# Patient Record
Sex: MALE | Race: WHITE | NOT HISPANIC OR LATINO | Employment: FULL TIME | URBAN - METROPOLITAN AREA
[De-identification: names, ages, dates, MRNs, and addresses within clinical notes are randomized per-mention and may not be internally consistent; named-entity substitution may affect disease eponyms.]

---

## 2017-02-07 ENCOUNTER — ALLSCRIPTS OFFICE VISIT (OUTPATIENT)
Dept: OTHER | Facility: OTHER | Age: 47
End: 2017-02-07

## 2017-11-30 ENCOUNTER — GENERIC CONVERSION - ENCOUNTER (OUTPATIENT)
Dept: OTHER | Facility: OTHER | Age: 47
End: 2017-11-30

## 2017-11-30 DIAGNOSIS — S39.012A STRAIN OF MUSCLE, FASCIA AND TENDON OF LOWER BACK, INITIAL ENCOUNTER: ICD-10-CM

## 2018-01-14 VITALS — HEART RATE: 80 BPM | DIASTOLIC BLOOD PRESSURE: 82 MMHG | SYSTOLIC BLOOD PRESSURE: 110 MMHG

## 2018-01-22 VITALS — SYSTOLIC BLOOD PRESSURE: 110 MMHG | HEART RATE: 72 BPM | RESPIRATION RATE: 16 BRPM | DIASTOLIC BLOOD PRESSURE: 80 MMHG

## 2020-02-21 ENCOUNTER — OFFICE VISIT (OUTPATIENT)
Dept: FAMILY MEDICINE CLINIC | Facility: CLINIC | Age: 50
End: 2020-02-21
Payer: COMMERCIAL

## 2020-02-21 VITALS
WEIGHT: 185 LBS | HEART RATE: 78 BPM | BODY MASS INDEX: 26.48 KG/M2 | SYSTOLIC BLOOD PRESSURE: 120 MMHG | HEIGHT: 70 IN | DIASTOLIC BLOOD PRESSURE: 70 MMHG | OXYGEN SATURATION: 98 %

## 2020-02-21 DIAGNOSIS — R53.83 FATIGUE, UNSPECIFIED TYPE: Primary | ICD-10-CM

## 2020-02-21 DIAGNOSIS — Z13.1 SCREENING FOR DIABETES MELLITUS: ICD-10-CM

## 2020-02-21 DIAGNOSIS — Z13.6 SCREENING FOR CARDIOVASCULAR CONDITION: ICD-10-CM

## 2020-02-21 DIAGNOSIS — M25.50 ARTHRALGIA, UNSPECIFIED JOINT: ICD-10-CM

## 2020-02-21 DIAGNOSIS — M79.10 MYALGIA: ICD-10-CM

## 2020-02-21 PROCEDURE — 1036F TOBACCO NON-USER: CPT | Performed by: NURSE PRACTITIONER

## 2020-02-21 PROCEDURE — 99214 OFFICE O/P EST MOD 30 MIN: CPT | Performed by: NURSE PRACTITIONER

## 2020-02-21 PROCEDURE — 3008F BODY MASS INDEX DOCD: CPT | Performed by: NURSE PRACTITIONER

## 2020-02-21 NOTE — PROGRESS NOTES
[facility]        NAME: Fern Rice II is a 54 y o  male  : 1970    MRN: [id number]  DATE: 2020  TIME: 9:01 AM    Assessment and Plan   Fatigue, unspecified type [R53 83]  1  Fatigue, unspecified type  TSH, 3rd generation with Free T4 reflex    CBC and differential    Lyme Antibody Profile with reflex to WB    TSH, 3rd generation with Free T4 reflex    CBC and differential    Lyme Antibody Profile with reflex to WB   2  Screening for cardiovascular condition  TSH, 3rd generation with Free T4 reflex    Lipid panel    TSH, 3rd generation with Free T4 reflex    Lipid panel   3  Screening for diabetes mellitus  Comprehensive metabolic panel    Comprehensive metabolic panel   4  Myalgia     5  Arthralgia, unspecified joint  Lyme Antibody Profile with reflex to WB         Patient Instructions     Patient Instructions   Labs as ordered  Schedule appointment for annual physical which is due now  Call/retunw if symptoms worsen  Call/return with any problems or concerns          Chief Complaint     Chief Complaint   Patient presents with   [address] [place] [address] Patient     "old new pt"--fatigue, body aches x 12days         History of Present Illness       Body aches, fatigue x 2 weeks  Sleeping more than usual  Not feeling rested  States always has general aches and pains  Patient is a   Denies chest pain, sob  Has some post nasal drip  No other complaints  Due for annual physical      Review of Systems   Review of Systems   Constitutional: Positive for fatigue  Negative for appetite change, chills, diaphoresis and fever  HENT: Negative for congestion, ear pain, postnasal drip, rhinorrhea, sinus pressure, sinus pain and sore throat  Respiratory: Negative  Negative for cough, shortness of breath and wheezing  Cardiovascular: Negative  Negative for chest pain, palpitations and leg swelling  Gastrointestinal: Negative    Negative for abdominal distention, abdominal pain, constipation, diarrhea, nausea and vomiting  Musculoskeletal: Positive for arthralgias and myalgias  Neurological: Positive for headaches  Negative for dizziness, weakness and light-headedness  Psychiatric/Behavioral: Negative  Current Medications     No current outpatient medications on file  Current Allergies     Allergies as of 02/21/2020 - Reviewed 02/21/2020   Allergen Reaction Noted    Asa [aspirin]  02/21/2020            The following portions of the patient's history were reviewed and updated as appropriate: allergies, current medications, past family history, past medical history, past social history, past surgical history and problem list      History reviewed  No pertinent past medical history  History reviewed  No pertinent surgical history  Family History   Problem Relation Age of Onset    Diabetes Mother          Medications have been verified  Objective   /70   Pulse 78   Ht 5' 9 5" (1 765 m)   Wt 83 9 kg (185 lb)   SpO2 98%   BMI 26 93 kg/m²        Physical Exam     Physical Exam   Constitutional: He is oriented to person, place, and time  He appears well-developed and well-nourished  HENT:   Head: Normocephalic  Right Ear: External ear normal    Left Ear: External ear normal    Nose: Nose normal    Mouth/Throat: Posterior oropharyngeal erythema (mild erythema, moderate mucous noted ) present  No oropharyngeal exudate  Eyes: EOM are normal    Neck: Normal range of motion  Neck supple  Cardiovascular: Normal rate, regular rhythm and normal heart sounds  Pulmonary/Chest: Effort normal and breath sounds normal  He has no wheezes  Musculoskeletal: Normal range of motion  He exhibits no edema or deformity  Neurological: He is alert and oriented to person, place, and time  Skin: Skin is warm and dry  Psychiatric: He has a normal mood and affect   His behavior is normal  Judgment and thought content normal    Nursing note and vitals reviewed  BMI Counseling: Body mass index is 26 93 kg/m²  The BMI is above normal  Nutrition recommendations include reducing portion sizes

## 2020-02-21 NOTE — PATIENT INSTRUCTIONS
Labs as ordered  Schedule appointment for annual physical which is due now  Call/retunw if symptoms worsen  Call/return with any problems or concerns

## 2020-02-25 DIAGNOSIS — R53.83 FATIGUE, UNSPECIFIED TYPE: Primary | ICD-10-CM

## 2020-02-25 LAB
ALBUMIN SERPL-MCNC: 4.2 G/DL (ref 4–5)
ALBUMIN/GLOB SERPL: 1.7 {RATIO} (ref 1.2–2.2)
ALP SERPL-CCNC: 75 IU/L (ref 39–117)
ALT SERPL-CCNC: 21 IU/L (ref 0–44)
AST SERPL-CCNC: 17 IU/L (ref 0–40)
B BURGDOR IGG+IGM SER-ACNC: <0.91 ISR (ref 0–0.9)
BASOPHILS # BLD AUTO: 0.1 X10E3/UL (ref 0–0.2)
BASOPHILS NFR BLD AUTO: 1 %
BILIRUB SERPL-MCNC: 0.6 MG/DL (ref 0–1.2)
BUN SERPL-MCNC: 15 MG/DL (ref 6–24)
BUN/CREAT SERPL: 18 (ref 9–20)
CALCIUM SERPL-MCNC: 9.1 MG/DL (ref 8.7–10.2)
CHLORIDE SERPL-SCNC: 103 MMOL/L (ref 96–106)
CHOLEST SERPL-MCNC: 218 MG/DL (ref 100–199)
CHOLEST/HDLC SERPL: 3.2 RATIO (ref 0–5)
CO2 SERPL-SCNC: 23 MMOL/L (ref 20–29)
CREAT SERPL-MCNC: 0.82 MG/DL (ref 0.76–1.27)
EOSINOPHIL # BLD AUTO: 0.4 X10E3/UL (ref 0–0.4)
EOSINOPHIL NFR BLD AUTO: 4 %
ERYTHROCYTE [DISTWIDTH] IN BLOOD BY AUTOMATED COUNT: 12.9 % (ref 11.6–15.4)
GLOBULIN SER-MCNC: 2.5 G/DL (ref 1.5–4.5)
GLUCOSE SERPL-MCNC: 98 MG/DL (ref 65–99)
HCT VFR BLD AUTO: 40.6 % (ref 37.5–51)
HDLC SERPL-MCNC: 69 MG/DL
HGB BLD-MCNC: 14.2 G/DL (ref 13–17.7)
IMM GRANULOCYTES # BLD: 0 X10E3/UL (ref 0–0.1)
IMM GRANULOCYTES NFR BLD: 0 %
LDLC SERPL CALC-MCNC: 132 MG/DL (ref 0–99)
LYMPHOCYTES # BLD AUTO: 2.6 X10E3/UL (ref 0.7–3.1)
LYMPHOCYTES NFR BLD AUTO: 26 %
MCH RBC QN AUTO: 30.7 PG (ref 26.6–33)
MCHC RBC AUTO-ENTMCNC: 35 G/DL (ref 31.5–35.7)
MCV RBC AUTO: 88 FL (ref 79–97)
MONOCYTES # BLD AUTO: 0.8 X10E3/UL (ref 0.1–0.9)
MONOCYTES NFR BLD AUTO: 8 %
NEUTROPHILS # BLD AUTO: 6 X10E3/UL (ref 1.4–7)
NEUTROPHILS NFR BLD AUTO: 61 %
PLATELET # BLD AUTO: 262 X10E3/UL (ref 150–450)
POTASSIUM SERPL-SCNC: 4.2 MMOL/L (ref 3.5–5.2)
PROT SERPL-MCNC: 6.7 G/DL (ref 6–8.5)
RBC # BLD AUTO: 4.62 X10E6/UL (ref 4.14–5.8)
SL AMB EGFR AFRICAN AMERICAN: 122 ML/MIN/1.73
SL AMB EGFR NON AFRICAN AMERICAN: 105 ML/MIN/1.73
SL AMB VLDL CHOLESTEROL CALC: 17 MG/DL (ref 5–40)
SODIUM SERPL-SCNC: 139 MMOL/L (ref 134–144)
TRIGL SERPL-MCNC: 83 MG/DL (ref 0–149)
TSH SERPL DL<=0.005 MIU/L-ACNC: 1.64 UIU/ML (ref 0.45–4.5)
WBC # BLD AUTO: 10 X10E3/UL (ref 3.4–10.8)

## 2020-02-29 LAB — TESTOST SERPL-MCNC: 409 NG/DL (ref 264–916)

## 2020-03-03 ENCOUNTER — TELEPHONE (OUTPATIENT)
Dept: FAMILY MEDICINE CLINIC | Facility: CLINIC | Age: 50
End: 2020-03-03

## 2020-03-03 NOTE — TELEPHONE ENCOUNTER
----- Message from [address] [place] [address],[address] sent at 3/2/2020 10:33 AM EST -----  Call patient   Testosterone level is normal

## 2023-12-19 ENCOUNTER — OFFICE VISIT (OUTPATIENT)
Dept: FAMILY MEDICINE CLINIC | Facility: CLINIC | Age: 53
End: 2023-12-19
Payer: COMMERCIAL

## 2023-12-19 VITALS
SYSTOLIC BLOOD PRESSURE: 138 MMHG | HEIGHT: 69 IN | WEIGHT: 170 LBS | BODY MASS INDEX: 25.18 KG/M2 | OXYGEN SATURATION: 100 % | DIASTOLIC BLOOD PRESSURE: 86 MMHG | HEART RATE: 83 BPM | TEMPERATURE: 97.4 F

## 2023-12-19 DIAGNOSIS — Z12.5 SCREENING PSA (PROSTATE SPECIFIC ANTIGEN): ICD-10-CM

## 2023-12-19 DIAGNOSIS — J01.00 ACUTE NON-RECURRENT MAXILLARY SINUSITIS: ICD-10-CM

## 2023-12-19 DIAGNOSIS — Z00.00 ANNUAL PHYSICAL EXAM: Primary | ICD-10-CM

## 2023-12-19 PROCEDURE — 99213 OFFICE O/P EST LOW 20 MIN: CPT | Performed by: NURSE PRACTITIONER

## 2023-12-19 PROCEDURE — 99396 PREV VISIT EST AGE 40-64: CPT | Performed by: NURSE PRACTITIONER

## 2023-12-19 RX ORDER — AZITHROMYCIN 250 MG/1
TABLET, FILM COATED ORAL DAILY
Qty: 6 TABLET | Refills: 0 | Status: SHIPPED | OUTPATIENT
Start: 2023-12-19 | End: 2023-12-24

## 2023-12-19 RX ORDER — PREDNISONE 20 MG/1
TABLET ORAL
Qty: 15 TABLET | Refills: 0 | Status: SHIPPED | OUTPATIENT
Start: 2023-12-19

## 2023-12-19 NOTE — PROGRESS NOTES
[facility]        NAME: Mary Snyder II is a 53 y.o. male  : 1970    MRN: [id number]  DATE: 2023  TIME: 12:15 PM    Assessment and Plan   Annual physical exam [Z00.00]  1. Annual physical exam  Comprehensive metabolic panel    Lipid panel    Comprehensive metabolic panel    Lipid panel      2. Acute non-recurrent maxillary sinusitis  predniSONE 20 mg tablet    azithromycin (Zithromax) 250 mg tablet            Patient Instructions     Patient Instructions   Discussed viral vs bacterial infection  RX as ordered  Continue OTC cough/cold medications as directed  Call or return for problems/concerns        Chief Complaint     Chief Complaint   Patient presents with    Sinus Problem    Physical Exam    Headache         History of Present Illness       C/o sinus pain and pressure since last week. Taking OTC cold medications with no improvement.        Review of Systems   Review of Systems   Constitutional:  Negative for activity change, chills, fatigue and fever.   HENT:  Positive for congestion, ear pain, postnasal drip, rhinorrhea, sinus pressure and sinus pain. Negative for sore throat.    Eyes:  Negative for pain, discharge and redness.   Respiratory:  Positive for cough. Negative for wheezing.    Cardiovascular:  Negative for chest pain.   Gastrointestinal:  Negative for constipation, diarrhea, nausea and vomiting.   Musculoskeletal:  Negative for myalgias.   Skin:  Negative for rash.   Neurological:  Positive for headaches. Negative for dizziness.         Current Medications       Current Outpatient Medications:     azithromycin (Zithromax) 250 mg tablet, Take 2 tablets (500 mg total) by mouth daily for 1 day, THEN 1 tablet (250 mg total) daily for 4 days., Disp: 6 tablet, Rfl: 0    predniSONE 20 mg tablet, Take one tablet twice a day x 5 days, then take one tablet daily x 5 days, Disp: 15 tablet, Rfl: 0    Current Allergies     Allergies as of 2023 -  "Reviewed 12/19/2023   Allergen Reaction Noted    Asa [aspirin]  02/21/2020            The following portions of the patient's history were reviewed and updated as appropriate: allergies, current medications, past family history, past medical history, past social history, past surgical history and problem list.     History reviewed. No pertinent past medical history.    History reviewed. No pertinent surgical history.    Family History   Problem Relation Age of Onset    Diabetes Mother          Medications have been verified.        Objective   /86 (BP Location: Left arm, Patient Position: Sitting, Cuff Size: Standard)   Pulse 83   Temp (!) 97.4 °F (36.3 °C) (Tympanic)   Ht 5' 9\" (1.753 m)   Wt 77.1 kg (170 lb)   SpO2 100%   BMI 25.10 kg/m²        Physical Exam     Physical Exam  Vitals and nursing note reviewed.   Constitutional:       General: He is not in acute distress.     Appearance: Normal appearance. He is well-developed. He is not ill-appearing, toxic-appearing or diaphoretic.   HENT:      Head: Normocephalic and atraumatic.      Right Ear: Tympanic membrane, ear canal and external ear normal. There is no impacted cerumen.      Left Ear: Tympanic membrane, ear canal and external ear normal. There is no impacted cerumen.      Nose: Rhinorrhea present.      Right Sinus: Maxillary sinus tenderness present.      Left Sinus: Maxillary sinus tenderness present.      Mouth/Throat:      Mouth: Mucous membranes are moist.      Pharynx: Oropharynx is clear. Uvula midline. Posterior oropharyngeal erythema present. No oropharyngeal exudate.   Eyes:      General:         Right eye: No discharge.         Left eye: No discharge.      Extraocular Movements: Extraocular movements intact.      Conjunctiva/sclera: Conjunctivae normal.   Cardiovascular:      Rate and Rhythm: Normal rate and regular rhythm.      Heart sounds: Normal heart sounds. No murmur heard.     No friction rub. No gallop.   Pulmonary:      " Effort: Pulmonary effort is normal. No respiratory distress.      Breath sounds: Normal breath sounds. No wheezing or rales.   Abdominal:      General: There is no distension.   Musculoskeletal:         General: Normal range of motion.      Cervical back: Normal range of motion and neck supple. No rigidity or tenderness.   Skin:     General: Skin is warm and dry.      Coloration: Skin is not pale.      Findings: No erythema.   Neurological:      Mental Status: He is alert and oriented to person, place, and time.   Psychiatric:         Mood and Affect: Mood normal.         Behavior: Behavior normal.         Thought Content: Thought content normal.         Judgment: Judgment normal.

## 2023-12-19 NOTE — PATIENT INSTRUCTIONS
Discussed viral vs bacterial infection  RX as ordered  Continue OTC cough/cold medications as directed  Call or return for problems/concerns      Wellness Visit for Adults   AMBULATORY CARE:   A wellness visit  is when you see your healthcare provider to get screened for health problems. Your healthcare provider will also give you advice on how to stay healthy. Write down your questions so you remember to ask them. Ask your healthcare provider how often you should have a wellness visit.  What happens at a wellness visit:  Your healthcare provider will ask about your health, and your family history of health problems. This includes high blood pressure, heart disease, and cancer. He or she will ask if you have symptoms that concern you, if you smoke, and about your mood. You may also be asked about your intake of medicines, supplements, food, and alcohol. Any of the following may be done:  Your weight  will be checked. Your height may also be checked so your body mass index (BMI) can be calculated. Your BMI shows if you are at a healthy weight.    Your blood pressure  and heart rate will be checked. Your temperature may also be checked.    Blood and urine tests  may be done. Blood tests may be done to check your cholesterol levels. Abnormal cholesterol levels increase your risk for heart disease and stroke. You may also need a blood or urine test to check for diabetes if you are at increased risk. Urine tests may be done to look for signs of an infection or kidney disease.    A physical exam  includes checking your heartbeat and lungs with a stethoscope. Your healthcare provider may also check your skin to look for sun damage.    Screening tests  may be recommended. A screening test is done to check for diseases that may not cause symptoms. The screening tests you may need depend on your age, gender, family history, and lifestyle habits. For example, colorectal screening may be recommended if you are 50 years old or  older.    Screening tests you need if you are a woman:   A Pap smear  is used to screen for cervical cancer. Pap smears are usually done every 3 to 5 years depending on your age. You may need them more often if you have had abnormal Pap smear test results in the past. Ask your healthcare provider how often you should have a Pap smear.    A mammogram  is an x-ray of your breasts to screen for breast cancer. Experts recommend mammograms every 2 years starting at age 50 years. You may need a mammogram at age 49 years or younger if you have an increased risk for breast cancer. Talk to your healthcare provider about when you should start having mammograms and how often you need them.    Vaccines you may need:   Get an influenza vaccine  every year. The influenza vaccine protects you from the flu. Several types of viruses cause the flu. The viruses change over time, so new vaccines are made each year.    Get a tetanus-diphtheria (Td) booster vaccine  every 10 years. This vaccine protects you against tetanus and diphtheria. Tetanus is a severe infection that may cause painful muscle spasms and lockjaw. Diphtheria is a severe bacterial infection that causes a thick covering in the back of your mouth and throat.    Get a human papillomavirus (HPV) vaccine  if you are female and aged 19 to 26 or male 19 to 21 and never received it. This vaccine protects you from HPV infection. HPV is the most common infection spread by sexual contact. HPV may also cause vaginal, penile, and anal cancers.    Get a pneumococcal vaccine  if you are aged 65 years or older. The pneumococcal vaccine is an injection given to protect you from pneumococcal disease. Pneumococcal disease is an infection caused by pneumococcal bacteria. The infection may cause pneumonia, meningitis, or an ear infection.    Get a shingles vaccine  if you are 60 or older, even if you have had shingles before. The shingles vaccine is an injection to protect you from the  varicella-zoster virus. This is the same virus that causes chickenpox. Shingles is a painful rash that develops in people who had chickenpox or have been exposed to the virus.    How to eat healthy:  My Plate is a model for planning healthy meals. It shows the types and amounts of foods that should go on your plate. Fruits and vegetables make up about half of your plate, and grains and protein make up the other half. A serving of dairy is included on the side of your plate. The amount of calories and serving sizes you need depends on your age, gender, weight, and height. Examples of healthy foods are listed below:  Eat a variety of vegetables  such as dark green, red, and orange vegetables. You can also include canned vegetables low in sodium (salt) and frozen vegetables without added butter or sauces.    Eat a variety of fresh fruits , canned fruit in 100% juice, frozen fruit, and dried fruit.    Include whole grains.  At least half of the grains you eat should be whole grains. Examples include whole-wheat bread, wheat pasta, brown rice, and whole-grain cereals such as oatmeal.    Eat a variety of protein foods such as seafood (fish and shellfish), lean meat, and poultry without skin (turkey and chicken). Examples of lean meats include pork leg, shoulder, or tenderloin, and beef round, sirloin, tenderloin, and extra lean ground beef. Other protein foods include eggs and egg substitutes, beans, peas, soy products, nuts, and seeds.    Choose low-fat dairy products such as skim or 1% milk or low-fat yogurt, cheese, and cottage cheese.    Limit unhealthy fats  such as butter, hard margarine, and shortening.       Exercise:  Exercise at least 30 minutes per day on most days of the week. Some examples of exercise include walking, biking, dancing, and swimming. You can also fit in more physical activity by taking the stairs instead of the elevator or parking farther away from stores. Include muscle strengthening  activities 2 days each week. Regular exercise provides many health benefits. It helps you manage your weight, and decreases your risk for type 2 diabetes, heart disease, stroke, and high blood pressure. Exercise can also help improve your mood. Ask your healthcare provider about the best exercise plan for you.       General health and safety guidelines:   Do not smoke.  Nicotine and other chemicals in cigarettes and cigars can cause lung damage. Ask your healthcare provider for information if you currently smoke and need help to quit. E-cigarettes or smokeless tobacco still contain nicotine. Talk to your healthcare provider before you use these products.    Limit alcohol.  A drink of alcohol is 12 ounces of beer, 5 ounces of wine, or 1½ ounces of liquor.    Lose weight, if needed.  Being overweight increases your risk of certain health conditions. These include heart disease, high blood pressure, type 2 diabetes, and certain types of cancer.    Protect your skin.  Do not sunbathe or use tanning beds. Use sunscreen with a SPF 15 or higher. Apply sunscreen at least 15 minutes before you go outside. Reapply sunscreen every 2 hours. Wear protective clothing, hats, and sunglasses when you are outside.    Drive safely.  Always wear your seatbelt. Make sure everyone in your car wears a seatbelt. A seatbelt can save your life if you are in an accident. Do not use your cell phone when you are driving. This could distract you and cause an accident. Pull over if you need to make a call or send a text message.    Practice safe sex.  Use latex condoms if are sexually active and have more than one partner. Your healthcare provider may recommend screening tests for sexually transmitted infections (STIs).    Wear helmets, lifejackets, and protective gear.  Always wear a helmet when you ride a bike or motorcycle, go skiing, or play sports that could cause a head injury. Wear protective equipment when you play sports. Wear a  franc when you are on a boat or doing water sports.    © Copyright [employer] 2023 Information is for End User's use only and may not be sold, redistributed or otherwise used for commercial purposes.  The above information is an  only. It is not intended as medical advice for individual conditions or treatments. Talk to your doctor, nurse or pharmacist before following any medical regimen to see if it is safe and effective for you.

## 2023-12-19 NOTE — PROGRESS NOTES
ADULT ANNUAL PHYSICAL  [facility]    NAME: Mary Snyder II  AGE: 53 y.o. SEX: male  : 1970     DATE: 2023     Assessment and Plan:     Problem List Items Addressed This Visit    None  Visit Diagnoses       Annual physical exam    -  Primary    Relevant Orders    Comprehensive metabolic panel    Lipid panel    Acute non-recurrent maxillary sinusitis        Relevant Medications    predniSONE 20 mg tablet    azithromycin (Zithromax) 250 mg tablet    Screening PSA (prostate specific antigen)        Relevant Orders    PSA, total and free            Immunizations and preventive care screenings were discussed with patient today. Appropriate education was printed on patient's after visit summary.        Counseling:  Alcohol/drug use: discussed moderation in alcohol intake, the recommendations for healthy alcohol use, and avoidance of illicit drug use.  Dental Health: discussed importance of regular tooth brushing, flossing, and dental visits.  Injury prevention: discussed safety/seat belts, safety helmets, smoke detectors, carbon dioxide detectors, and smoking near bedding or upholstery.  Sexual health: discussed sexually transmitted diseases, partner selection, use of condoms, avoidance of unintended pregnancy, and contraceptive alternatives.  Exercise: the importance of regular exercise/physical activity was discussed. Recommend exercise 3-5 times per week for at least 30 minutes.     BMI Counseling: Body mass index is 25.1 kg/m². The BMI is above normal. Nutrition recommendations include decreasing portion sizes and encouraging healthy choices of fruits and vegetables. Rationale for BMI follow-up plan is due to patient being overweight or obese.     Depression Screening and Follow-up Plan: Patient was screened for depression during today's encounter. They screened negative with a PHQ-2 score of 0.        Return if symptoms worsen or  fail to improve.     Chief Complaint:     Chief Complaint   Patient presents with    Sinus Problem    Physical Exam    Headache      History of Present Illness:     Adult Annual Physical   Patient here for a comprehensive physical exam. The patient reports problems - sinus infection .    Diet and Physical Activity  Diet/Nutrition: well balanced diet.   Exercise: no formal exercise.      Depression Screening  PHQ-2/9 Depression Screening    Little interest or pleasure in doing things: 0 - not at all  Feeling down, depressed, or hopeless: 0 - not at all  PHQ-2 Score: 0  PHQ-2 Interpretation: Negative depression screen       General Health  Sleep: sleeps well.   Hearing: normal - bilateral.  Vision: wears glasses.   Dental: regular dental visits.        Health  Symptoms include: none    Advanced Care Planning  Do you have an advanced directive? no  Do you have a durable medical power of ? no     Review of Systems:     Review of Systems   Constitutional:  Negative for activity change, appetite change, chills, diaphoresis, fatigue and fever.   HENT:  Positive for congestion, ear pain, postnasal drip, rhinorrhea, sinus pressure and sinus pain. Negative for dental problem, drooling, ear discharge, facial swelling, hearing loss, sneezing, sore throat, tinnitus, trouble swallowing and voice change.    Eyes:  Negative for photophobia, pain, discharge, redness, itching and visual disturbance.   Respiratory:  Positive for cough. Negative for apnea, choking, chest tightness, shortness of breath, wheezing and stridor.    Cardiovascular:  Negative for chest pain, palpitations and leg swelling.   Gastrointestinal:  Negative for abdominal distention, abdominal pain, anal bleeding, blood in stool, constipation, diarrhea, nausea, rectal pain and vomiting.   Endocrine: Negative for cold intolerance, heat intolerance, polydipsia, polyphagia and polyuria.   Genitourinary:  Negative for decreased urine volume, difficulty  urinating, dysuria, flank pain, frequency, hematuria and urgency.   Musculoskeletal:  Negative for arthralgias, back pain, gait problem, joint swelling, myalgias, neck pain and neck stiffness.   Skin:  Negative for color change, pallor, rash and wound.   Neurological:  Positive for headaches. Negative for dizziness, tremors, seizures, syncope, facial asymmetry, speech difficulty, weakness, light-headedness and numbness.   Hematological:  Negative for adenopathy. Does not bruise/bleed easily.   Psychiatric/Behavioral:  Negative for agitation, behavioral problems, confusion, decreased concentration, dysphoric mood, hallucinations, self-injury, sleep disturbance and suicidal ideas. The patient is not nervous/anxious and is not hyperactive.       Past Medical History:     History reviewed. No pertinent past medical history.   Past Surgical History:     History reviewed. No pertinent surgical history.   Family History:     Family History   Problem Relation Age of Onset    Diabetes Mother       Social History:     Social History     Socioeconomic History    Marital status: /Civil Union     Spouse name: None    Number of children: None    Years of education: None    Highest education level: None   Occupational History    None   Tobacco Use    Smoking status: Former    Smokeless tobacco: Never   Vaping Use    Vaping status: Never Used   Substance and Sexual Activity    Alcohol use: Yes    Drug use: Never    Sexual activity: None   Other Topics Concern    None   Social History Narrative    None     Social Determinants of Health     Financial Resource Strain: Not on file   Food Insecurity: Not on file   Transportation Needs: Not on file   Physical Activity: Not on file   Stress: Not on file   Social Connections: Not on file   Intimate Partner Violence: Not on file   Housing Stability: Not on file      Current Medications:     Current Outpatient Medications   Medication Sig Dispense Refill    azithromycin (Zithromax)  "250 mg tablet Take 2 tablets (500 mg total) by mouth daily for 1 day, THEN 1 tablet (250 mg total) daily for 4 days. 6 tablet 0    predniSONE 20 mg tablet Take one tablet twice a day x 5 days, then take one tablet daily x 5 days 15 tablet 0     No current facility-administered medications for this visit.      Allergies:     Allergies   Allergen Reactions    Asa [Aspirin]       Physical Exam:     /86 (BP Location: Left arm, Patient Position: Sitting, Cuff Size: Standard)   Pulse 83   Temp (!) 97.4 °F (36.3 °C) (Tympanic)   Ht 5' 9\" (1.753 m)   Wt 77.1 kg (170 lb)   SpO2 100%   BMI 25.10 kg/m²     Physical Exam  Vitals and nursing note reviewed.   Constitutional:       General: He is not in acute distress.     Appearance: Normal appearance. He is well-developed. He is not ill-appearing, toxic-appearing or diaphoretic.   HENT:      Head: Normocephalic and atraumatic.      Right Ear: Tympanic membrane, ear canal and external ear normal. There is no impacted cerumen.      Left Ear: Tympanic membrane, ear canal and external ear normal. There is no impacted cerumen.      Nose: Rhinorrhea present. No congestion.      Right Sinus: Maxillary sinus tenderness present.      Left Sinus: Maxillary sinus tenderness present.      Mouth/Throat:      Mouth: Mucous membranes are moist.      Pharynx: Oropharynx is clear. Posterior oropharyngeal erythema present. No oropharyngeal exudate.   Eyes:      General:         Right eye: No discharge.         Left eye: No discharge.      Extraocular Movements: Extraocular movements intact.      Conjunctiva/sclera: Conjunctivae normal.   Cardiovascular:      Rate and Rhythm: Normal rate and regular rhythm.      Heart sounds: Normal heart sounds. No murmur heard.     No friction rub. No gallop.   Pulmonary:      Effort: Pulmonary effort is normal. No respiratory distress.      Breath sounds: Normal breath sounds. No stridor. No wheezing, rhonchi or rales.   Chest:      Chest wall: No " tenderness.   Abdominal:      General: Bowel sounds are normal. There is no distension.      Palpations: Abdomen is soft. There is no mass.      Tenderness: There is no abdominal tenderness. There is no right CVA tenderness, left CVA tenderness, guarding or rebound.      Hernia: No hernia is present.   Musculoskeletal:         General: No swelling, tenderness, deformity or signs of injury. Normal range of motion.      Cervical back: Normal range of motion and neck supple. No rigidity or tenderness.      Right lower leg: No edema.      Left lower leg: No edema.   Skin:     General: Skin is warm and dry.      Capillary Refill: Capillary refill takes less than 2 seconds.      Coloration: Skin is not jaundiced or pale.      Findings: No bruising, erythema, lesion or rash.   Neurological:      General: No focal deficit present.      Mental Status: He is alert and oriented to person, place, and time.      Cranial Nerves: No cranial nerve deficit.      Sensory: No sensory deficit.      Motor: No weakness.      Coordination: Coordination normal.      Gait: Gait normal.      Deep Tendon Reflexes: Reflexes normal.   Psychiatric:         Mood and Affect: Mood normal.         Behavior: Behavior normal.         Thought Content: Thought content normal.         Judgment: Judgment normal.          MERCED Warner  [facility]

## 2024-05-03 ENCOUNTER — VBI (OUTPATIENT)
Dept: ADMINISTRATIVE | Facility: OTHER | Age: 54
End: 2024-05-03

## 2025-04-28 ENCOUNTER — VBI (OUTPATIENT)
Dept: ADMINISTRATIVE | Facility: OTHER | Age: 55
End: 2025-04-28

## 2025-04-28 NOTE — TELEPHONE ENCOUNTER
04/28/25 3:49 PM     Chart reviewed for CRC: Colonoscopy was/were not submitted to the patient's insurance.     Jeyson Banks MA   [facility]

## 2025-06-30 ENCOUNTER — OFFICE VISIT (OUTPATIENT)
Dept: FAMILY MEDICINE CLINIC | Facility: CLINIC | Age: 55
End: 2025-06-30
Payer: COMMERCIAL

## 2025-06-30 VITALS
WEIGHT: 174 LBS | HEIGHT: 70 IN | SYSTOLIC BLOOD PRESSURE: 120 MMHG | OXYGEN SATURATION: 96 % | BODY MASS INDEX: 24.91 KG/M2 | HEART RATE: 81 BPM | DIASTOLIC BLOOD PRESSURE: 80 MMHG

## 2025-06-30 DIAGNOSIS — Z12.12 SCREENING FOR COLORECTAL CANCER: ICD-10-CM

## 2025-06-30 DIAGNOSIS — Z12.11 SCREENING FOR COLORECTAL CANCER: ICD-10-CM

## 2025-06-30 DIAGNOSIS — Z00.00 ANNUAL PHYSICAL EXAM: Primary | ICD-10-CM

## 2025-06-30 DIAGNOSIS — M54.2 CERVICAL PAIN: ICD-10-CM

## 2025-06-30 DIAGNOSIS — Z12.5 ENCOUNTER FOR SCREENING PROSTATE SPECIFIC ANTIGEN (PSA) MEASUREMENT: ICD-10-CM

## 2025-06-30 PROCEDURE — 99396 PREV VISIT EST AGE 40-64: CPT | Performed by: NURSE PRACTITIONER

## 2025-06-30 PROCEDURE — 99213 OFFICE O/P EST LOW 20 MIN: CPT | Performed by: NURSE PRACTITIONER

## 2025-06-30 RX ORDER — PREDNISONE 20 MG/1
TABLET ORAL
Qty: 15 TABLET | Refills: 0 | Status: SHIPPED | OUTPATIENT
Start: 2025-06-30 | End: 2025-07-11 | Stop reason: ALTCHOICE

## 2025-06-30 NOTE — PROGRESS NOTES
Adult Annual Physical  Name: Mary Snyder II      : 1970      MRN: [id number]  Encounter Provider: MERCED Warner  Encounter Date: 2025   Encounter department: [facility]    :  Assessment & Plan  Cervical pain  Strained neck 6 weeks ago while working. Pain radiates down left arm, has numbness tingling in left arm.hand. Goes to a chiropractor he was told he has a pinched nerve. He had a massage and feels symptoms are worse since massage. He is to see Dr. Le.  He has been taking Ibuprofen with mild relief. Trial a course of Prednisone. Referred to pain management. Cervical xray ordered.    Orders:    XR spine cervical complete 4 or 5 vw non injury; Future    Ambulatory referral to Spine & Pain Management; Future    predniSONE 20 mg tablet; Take one tablet twice a day x 5 days, then take one tablet daily x 5 days    Annual physical exam  Labs ordered. No medications. Agrees to cologuard.    Orders:    Comprehensive metabolic panel; Future    Lipid panel; Future    Encounter for screening prostate specific antigen (PSA) measurement    Orders:    PSA, total and free; Future    Screening for colorectal cancer    Orders:    Cologuard        Preventive Screenings:  - Diabetes Screening: orders placed and risks/benefits discussed  - Cholesterol Screening: orders placed and risks/benefits discussed   - Hepatitis C screening: patient declines   - HIV screening: patient declines   - Colon cancer screening: risks/benefits discussed and orders placed   - Lung cancer screening: screening not indicated   - Prostate cancer screening: risks/benefits discussed and orders placed     Immunizations:  - Immunizations due: Prevnar 20 and Zoster (Shingrix)    Counseling/Anticipatory Guidance:    - Dental health: discussed importance of regular tooth brushing, flossing, and dental visits.   - Diet: discussed recommendations for a healthy/well-balanced diet.   - Exercise: the  importance of regular exercise/physical activity was discussed. Recommend exercise 3-5 times per week for at least 30 minutes.   - Injury prevention: discussed safety/seat belts, safety helmets, smoke detectors, carbon monoxide detectors, and smoking near bedding or upholstery.       Depression Screening and Follow-up Plan: Patient was screened for depression during today's encounter. They screened negative with a PHQ-2 score of 0.          History of Present Illness     Adult Annual Physical:  Patient presents for annual physical. Here for annual physical.  C/o neck pain..     Diet and Physical Activity:  - Diet/Nutrition: no special diet.  - Exercise: moderate cardiovascular exercise.    Depression Screening:  - PHQ-2 Score: 0    General Health:  - Sleep: > 8 hours of sleep on average.  - Hearing: normal hearing bilateral ears.  - Vision: wears glasses.  - Dental: regular dental visits.    /GYN Health:  - Follows with GYN: no.   - History of STDs: no     Health:  - History of STDs: no.   - Urinary symptoms: none.     Advanced Care Planning:  - Has an advanced directive?: no    - Has a durable medical POA?: no    - ACP document given to patient?: yes      Review of Systems   Constitutional:  Negative for activity change, appetite change, chills, diaphoresis, fatigue and fever.   HENT:  Negative for congestion, dental problem, drooling, ear discharge, ear pain, facial swelling, hearing loss, mouth sores, nosebleeds, postnasal drip, rhinorrhea, sinus pressure, sinus pain, sneezing, sore throat, tinnitus, trouble swallowing and voice change.    Eyes:  Negative for photophobia, pain, discharge, redness, itching and visual disturbance.   Respiratory:  Negative for apnea, cough, choking, chest tightness, shortness of breath, wheezing and stridor.    Cardiovascular:  Negative for chest pain, palpitations and leg swelling.   Gastrointestinal:  Negative for abdominal distention, abdominal pain, anal bleeding, blood in  "stool, constipation, diarrhea, nausea, rectal pain and vomiting.   Endocrine: Negative for cold intolerance, heat intolerance, polydipsia, polyphagia and polyuria.   Genitourinary:  Negative for decreased urine volume, difficulty urinating, dysuria, enuresis, flank pain, frequency, genital sores, hematuria, penile discharge, penile pain, penile swelling, scrotal swelling, testicular pain and urgency.   Musculoskeletal:  Positive for neck pain and neck stiffness. Negative for arthralgias, back pain, gait problem, joint swelling and myalgias.   Skin:  Negative for color change, pallor, rash and wound.   Neurological:  Positive for numbness (left arm). Negative for dizziness, tremors, seizures, syncope, facial asymmetry, speech difficulty, weakness, light-headedness and headaches.   Hematological:  Negative for adenopathy. Does not bruise/bleed easily.   Psychiatric/Behavioral:  Negative for agitation, behavioral problems, confusion, decreased concentration, dysphoric mood, hallucinations, self-injury, sleep disturbance and suicidal ideas. The patient is not nervous/anxious and is not hyperactive.          Objective   /80 (BP Location: Left arm, Patient Position: Sitting, Cuff Size: Standard)   Pulse 81   Ht 5' 9.5\" (1.765 m)   Wt 78.9 kg (174 lb)   SpO2 96%   BMI 25.33 kg/m²     Physical Exam  Vitals and nursing note reviewed.   Constitutional:       General: He is not in acute distress.     Appearance: Normal appearance. He is well-developed and normal weight. He is not ill-appearing, toxic-appearing or diaphoretic.   HENT:      Head: Normocephalic and atraumatic.      Right Ear: Tympanic membrane, ear canal and external ear normal. There is no impacted cerumen.      Left Ear: Tympanic membrane, ear canal and external ear normal. There is no impacted cerumen.      Nose: Nose normal. No congestion or rhinorrhea.      Mouth/Throat:      Mouth: Mucous membranes are moist.      Pharynx: Oropharynx is clear. No " oropharyngeal exudate or posterior oropharyngeal erythema.     Eyes:      General:         Right eye: No discharge.         Left eye: No discharge.      Extraocular Movements: Extraocular movements intact.      Conjunctiva/sclera: Conjunctivae normal.      Pupils: Pupils are equal, round, and reactive to light.     Neck:      Vascular: No carotid bruit.     Cardiovascular:      Rate and Rhythm: Normal rate and regular rhythm.      Heart sounds: Normal heart sounds. No murmur heard.     No friction rub. No gallop.   Pulmonary:      Effort: Pulmonary effort is normal. No respiratory distress.      Breath sounds: Normal breath sounds. No stridor. No wheezing, rhonchi or rales.   Chest:      Chest wall: No tenderness.   Abdominal:      General: There is no distension.      Palpations: Abdomen is soft. There is no mass.      Tenderness: There is no abdominal tenderness. There is no right CVA tenderness, left CVA tenderness, guarding or rebound.      Hernia: No hernia is present.     Musculoskeletal:         General: Tenderness present. No swelling, deformity or signs of injury. Normal range of motion.      Cervical back: Normal range of motion and neck supple. Spasms present. No swelling, deformity, rigidity or tenderness. Pain with movement present. Normal range of motion.      Right lower leg: No edema.      Left lower leg: No edema.   Lymphadenopathy:      Cervical: No cervical adenopathy.     Skin:     General: Skin is warm and dry.      Capillary Refill: Capillary refill takes less than 2 seconds.      Coloration: Skin is not jaundiced or pale.      Findings: No bruising, erythema, lesion or rash.     Neurological:      General: No focal deficit present.      Mental Status: He is alert and oriented to person, place, and time.      Cranial Nerves: No cranial nerve deficit.      Sensory: No sensory deficit.      Motor: No weakness.      Coordination: Coordination normal.      Gait: Gait normal.      Deep Tendon  Reflexes: Reflexes normal.     Psychiatric:         Mood and Affect: Mood normal.         Behavior: Behavior normal.         Thought Content: Thought content normal.         Judgment: Judgment normal.

## 2025-06-30 NOTE — PATIENT INSTRUCTIONS
"Patient Education     Routine physical for adults   The Basics   Written by the doctors and editors at [facility]   What is a physical? -- A physical is a routine visit, or \"check-up,\" with your doctor. You might also hear it called a \"wellness visit\" or \"preventive visit.\"  During each visit, the doctor will:   Ask about your physical and mental health   Ask about your habits, behaviors, and lifestyle   Do an exam   Give you vaccines if needed   Talk to you about any medicines you take   Give advice about your health   Answer your questions  Getting regular check-ups is an important part of taking care of your health. It can help your doctor find and treat any problems you have. But it's also important for preventing health problems.  A routine physical is different from a \"sick visit.\" A sick visit is when you see a doctor because of a health concern or problem. Since physicals are scheduled ahead of time, you can think about what you want to ask the doctor.  How often should I get a physical? -- It depends on your age and health. In general, for people age 21 years and older:   If you are younger than 50 years, you might be able to get a physical every 3 years.   If you are 50 years or older, your doctor might recommend a physical every year.  If you have an ongoing health condition, like diabetes or high blood pressure, your doctor will probably want to see you more often.  What happens during a physical? -- In general, each visit will include:   Physical exam - The doctor or nurse will check your height, weight, heart rate, and blood pressure. They will also look at your eyes and ears. They will ask about how you are feeling and whether you have any symptoms that bother you.   Medicines - It's a good idea to bring a list of all the medicines you take to each doctor visit. Your doctor will talk to you about your medicines and answer any questions. Tell them if you are having any side effects that bother you. You " "should also tell them if you are having trouble paying for any of your medicines.   Habits and behaviors - This includes:   Your diet   Your exercise habits   Whether you smoke, drink alcohol, or use drugs   Whether you are sexually active   Whether you feel safe at home  Your doctor will talk to you about things you can do to improve your health and lower your risk of health problems. They will also offer help and support. For example, if you want to quit smoking, they can give you advice and might prescribe medicines. If you want to improve your diet or get more physical activity, they can help you with this, too.   Lab tests, if needed - The tests you get will depend on your age and situation. For example, your doctor might want to check your:   Cholesterol   Blood sugar   Iron level   Vaccines - The recommended vaccines will depend on your age, health, and what vaccines you already had. Vaccines are very important because they can prevent certain serious or deadly infections.   Discussion of screening - \"Screening\" means checking for diseases or other health problems before they cause symptoms. Your doctor can recommend screening based on your age, risk, and preferences. This might include tests to check for:   Cancer, such as breast, prostate, cervical, ovarian, colorectal, prostate, lung, or skin cancer   Sexually transmitted infections, such as chlamydia and gonorrhea   Mental health conditions like depression and anxiety  Your doctor will talk to you about the different types of screening tests. They can help you decide which screenings to have. They can also explain what the results might mean.   Answering questions - The physical is a good time to ask the doctor or nurse questions about your health. If needed, they can refer you to other doctors or specialists, too.  Adults older than 65 years often need other care, too. As you get older, your doctor will talk to you about:   How to prevent falling at " home   Hearing or vision tests   Memory testing   How to take your medicines safely   Making sure that you have the help and support you need at home  All topics are updated as new evidence becomes available and our peer review process is complete.  This topic retrieved from [employer] on: May 02, 2024.  Topic 429779 Version 1.0  Release: 32.4.3 - C32.122  © 2024 UpToDate, Inc. and/or its affiliates. All rights reserved.  Consumer Information Use and Disclaimer   Disclaimer: This generalized information is a limited summary of diagnosis, treatment, and/or medication information. It is not meant to be comprehensive and should be used as a tool to help the user understand and/or assess potential diagnostic and treatment options. It does NOT include all information about conditions, treatments, medications, side effects, or risks that may apply to a specific patient. It is not intended to be medical advice or a substitute for the medical advice, diagnosis, or treatment of a health care provider based on the health care provider's examination and assessment of a patient's specific and unique circumstances. Patients must speak with a health care provider for complete information about their health, medical questions, and treatment options, including any risks or benefits regarding use of medications. This information does not endorse any treatments or medications as safe, effective, or approved for treating a specific patient. UpToDate, Inc. and its affiliates disclaim any warranty or liability relating to this information or the use thereof.The use of this information is governed by the Terms of Use, available at https://www.woltersSeratisuwer.com/en/know/clinical-effectiveness-terms. 2024© UpToDate, Inc. and its affiliates and/or licensors. All rights reserved.  Copyright   © 2024 UpToDate, Inc. and/or its affiliates. All rights reserved.

## 2025-06-30 NOTE — ASSESSMENT & PLAN NOTE
Strained neck 6 weeks ago while working. Pain radiates down left arm, has numbness tingling in left arm.hand. Goes to a chiropractor he was told he has a pinched nerve. He had a massage and feels symptoms are worse since massage. He is to see Dr. Le.  He has been taking Ibuprofen with mild relief. Trial a course of Prednisone. Referred to pain management. Cervical xray ordered.    Orders:    XR spine cervical complete 4 or 5 vw non injury; Future    Ambulatory referral to Spine & Pain Management; Future    predniSONE 20 mg tablet; Take one tablet twice a day x 5 days, then take one tablet daily x 5 days

## 2025-06-30 NOTE — ASSESSMENT & PLAN NOTE
Labs ordered. No medications. Agrees to cologuard.    Orders:    Comprehensive metabolic panel; Future    Lipid panel; Future

## 2025-07-02 ENCOUNTER — HOSPITAL ENCOUNTER (OUTPATIENT)
Dept: RADIOLOGY | Facility: HOSPITAL | Age: 55
Discharge: HOME | End: 2025-07-02
Payer: COMMERCIAL

## 2025-07-02 DIAGNOSIS — M54.2 CERVICAL PAIN: ICD-10-CM

## 2025-07-02 PROCEDURE — 72050 X-RAY EXAM NECK SPINE 4/5VWS: CPT

## 2025-07-11 ENCOUNTER — CONSULT (OUTPATIENT)
Dept: PAIN MEDICINE | Facility: CLINIC | Age: 55
End: 2025-07-11
Attending: NURSE PRACTITIONER
Payer: COMMERCIAL

## 2025-07-11 ENCOUNTER — TELEPHONE (OUTPATIENT)
Age: 55
End: 2025-07-11

## 2025-07-11 VITALS — BODY MASS INDEX: 25.77 KG/M2 | HEART RATE: 88 BPM | HEIGHT: 70 IN | TEMPERATURE: 97.7 F | WEIGHT: 180 LBS

## 2025-07-11 DIAGNOSIS — M54.12 LEFT CERVICAL RADICULOPATHY: Primary | ICD-10-CM

## 2025-07-11 DIAGNOSIS — M54.2 CERVICAL PAIN: ICD-10-CM

## 2025-07-11 PROCEDURE — 99204 OFFICE O/P NEW MOD 45 MIN: CPT | Performed by: ANESTHESIOLOGY

## 2025-07-11 RX ORDER — NAPROXEN 500 MG/1
500 TABLET ORAL 2 TIMES DAILY WITH MEALS
Qty: 60 TABLET | Refills: 0 | Status: SHIPPED | OUTPATIENT
Start: 2025-07-11

## 2025-07-11 NOTE — PROGRESS NOTES
Name: Mary Snyder II      : 1970      MRN: [id number]  Encounter Provider: Garry Arellano DO  Encounter Date: 2025   Encounter department: [facility] [place]  :  Assessment & Plan  Cervical pain    Orders:    Ambulatory referral to Spine & Pain Management    Left cervical radiculopathy    Orders:    MRI cervical spine wo contrast; Future    naproxen (NAPROSYN) 500 mg tablet; Take 1 tablet (500 mg total) by mouth 2 (two) times a day with meals      The patient's pain persists despite time, relative rest, activity modification, and nonsteroidal anti-inflammatories. His pain is significantly interfering with his daily living activities.  He has been undergoing chiropractic treatment has tried oral steroids, and has neurological deficit.  It is appropriate to order an MRI of the cervical spine to help evaluate any significant etiology of his symptoms.    Patient is to contact our office or present to hospital Emergency Room if experience worsening or new neck/upper extremity pain, sensory or motor change, bladder or bowel dysfunction, or other neurological change.    I will start him on a titrating dose of oral prednisone to address any inflammatory component of the patient's pain. He understands he should not take nonsteroidal anti-inflammatories until he is finished with this steroid. He understands there is a chance of decreased immunity secondary to steroids. If he has any problems or questions he will give us a call.    Once we obtain MRI results, I will follow-up with the patient, review the results and current symptoms, and discuss the next steps of the treatment plan.    My impressions and treatment recommendations were discussed in detail with the patient who verbalized understanding and had no further questions.  Discharge instructions were provided. I personally saw and examined the patient and I agree with the above discussed plan of care.    Referred by: Jacqui Santiago,  "MERCED    History of Present Illness     Mary Snyder II is a 54 y.o. male who injured himself while at work.  He has numbness and tingling down his left arm pain into his left scapula.  He has been undergoing chiropractic treatment but his symptoms persist for significant or fear with his daily living activities.  He has weakness of his left upper limb.  He is right-handed dominant he has numbness and tingling down his left arm.  He reports that open with all activities including lying down standing and sitting aggravate his symptoms.    Review of Systems  Medical History Reviewed by provider this encounter:  Tobacco  Meds  Problems  Med Hx  Surg Hx  Fam Hx     .       Objective   Pulse 88   Temp 97.7 °F (36.5 °C)   Ht 5' 9.5\" (1.765 m)   Wt 81.6 kg (180 lb)   BMI 26.20 kg/m²      Pain Score:   5  Physical Exam  Constitutional: normal, well developed, well nourished, alert, in no distress and non-toxic and no overt pain behavior.  Eyes: anicteric  HEENT: grossly intact  Neck: supple, symmetric, trachea midline and no masses   Pulmonary: even and unlabored  Cardiovascular: No edema or pitting edema present  Skin: Normal without rashes or lesions and well hydrated  Psychiatric: Mood and affect appropriate  Neurologic: Cranial Nerves II-XII grossly intact  Musculoskeletal: normal,  Inspection: Normal station and gait. Normal cervical curves and head posture. Skin intact without erythema. No sensory loss. There is no atrophy.   Palpation: There is mild tenderness in the left scapula no shoulder tenderness  Motor/Strength: Equal strength in the bilateral upper extremities, with the exception out of 3 out of 5 strength left biceps  Reflexes: equal and symmetric in the upper limbs. Sensation: Decreased sensation to left C6 distribution to pinwheel  Maneuvers: Positive cervical Spurling maneuver on the left. Negative Lhermitte's sign.      XR SPINE CERVICAL COMPLETE 4 OR 5 VW NON INJURY @  07/02/2025   "   INDICATION: M54.2: Cervicalgia.     COMPARISON: Cervical spine x-ray dated 7/22/2010     FINDINGS:     No fracture.     There is reversal of the normal cervical lordosis. There is grade 1 retrolisthesis of C5 on C6.     There is intervertebral disc space narrowing at C5/C6 and C6/C7.     There is multilevel bilateral facet arthropathy. There is right neuroforaminal narrowing at C5/C6 and C6/C7. Multilevel left neuroforaminal narrowing is also present.     Normal prevertebral soft tissues.     Clear lung apices.     IMPRESSION:     No acute osseous abnormality.     Degenerative changes as described.    I have personally reviewed pertinent films in PACS and my interpretation is cervical foraminal stenosis with multilevel spondylosis.

## 2025-07-11 NOTE — TELEPHONE ENCOUNTER
Caller: Patient    Doctor: Dr. CHILD    Reason for call: Patient called  to schedule his MRI, Was not able to schedule needed and auth number first    Please advise     [facility] Procedure    Call back#:

## 2025-07-12 LAB
ALBUMIN SERPL-MCNC: 4.1 G/DL (ref 3.8–4.9)
ALP SERPL-CCNC: 69 IU/L (ref 44–121)
ALT SERPL-CCNC: 25 IU/L (ref 0–44)
AST SERPL-CCNC: 18 IU/L (ref 0–40)
BILIRUB SERPL-MCNC: 0.7 MG/DL (ref 0–1.2)
BUN SERPL-MCNC: 14 MG/DL (ref 6–24)
BUN/CREAT SERPL: 15 (ref 9–20)
CALCIUM SERPL-MCNC: 9.1 MG/DL (ref 8.7–10.2)
CHLORIDE SERPL-SCNC: 101 MMOL/L (ref 96–106)
CHOLEST SERPL-MCNC: 234 MG/DL (ref 100–199)
CHOLEST/HDLC SERPL: 2.4 RATIO (ref 0–5)
CO2 SERPL-SCNC: 22 MMOL/L (ref 20–29)
CREAT SERPL-MCNC: 0.94 MG/DL (ref 0.76–1.27)
EGFR: 98 ML/MIN/1.73
GLOBULIN SER-MCNC: 2.3 G/DL (ref 1.5–4.5)
GLUCOSE SERPL-MCNC: 87 MG/DL (ref 70–99)
HDLC SERPL-MCNC: 98 MG/DL
LDLC SERPL CALC-MCNC: 109 MG/DL (ref 0–99)
POTASSIUM SERPL-SCNC: 4.2 MMOL/L (ref 3.5–5.2)
PROT SERPL-MCNC: 6.4 G/DL (ref 6–8.5)
PSA FREE MFR SERPL: 30 %
PSA FREE SERPL-MCNC: 0.15 NG/ML
PSA SERPL-MCNC: 0.5 NG/ML (ref 0–4)
SL AMB VLDL CHOLESTEROL CALC: 27 MG/DL (ref 5–40)
SODIUM SERPL-SCNC: 138 MMOL/L (ref 134–144)
TRIGL SERPL-MCNC: 164 MG/DL (ref 0–149)

## 2025-07-17 NOTE — TELEPHONE ENCOUNTER
Caller: [facility]    Doctor: Dr. velasco    Reason for call: npi [phone] please change mri to [facility]. thanks    Call back#: [phone]

## 2025-07-18 ENCOUNTER — TELEPHONE (OUTPATIENT)
Age: 55
End: 2025-07-18

## 2025-07-18 NOTE — TELEPHONE ENCOUNTER
Caller: aSra Juarez    Doctor: Dr. Arellano    Reason for call: Sara called for the MRI AUTH# [id number]     Call back#:

## 2025-07-28 ENCOUNTER — TELEPHONE (OUTPATIENT)
Dept: PAIN MEDICINE | Facility: CLINIC | Age: 55
End: 2025-07-28

## 2025-07-28 DIAGNOSIS — M48.02 CERVICAL SPINAL STENOSIS: Primary | ICD-10-CM

## 2025-07-31 LAB — COLOGUARD RESULT REPORTABLE: NEGATIVE

## 2025-08-15 ENCOUNTER — PREP FOR PROCEDURE (OUTPATIENT)
Age: 55
End: 2025-08-15

## 2025-08-15 ENCOUNTER — CONSULT (OUTPATIENT)
Age: 55
End: 2025-08-15
Attending: ANESTHESIOLOGY
Payer: COMMERCIAL

## 2025-08-15 ENCOUNTER — DOCUMENTATION (OUTPATIENT)
Age: 55
End: 2025-08-15

## 2025-08-19 ENCOUNTER — TELEPHONE (OUTPATIENT)
Age: 55
End: 2025-08-19

## 2025-08-19 DIAGNOSIS — M54.2 CERVICAL PAIN: Primary | ICD-10-CM

## 2025-08-22 ENCOUNTER — TELEPHONE (OUTPATIENT)
Age: 55
End: 2025-08-22